# Patient Record
Sex: MALE
[De-identification: names, ages, dates, MRNs, and addresses within clinical notes are randomized per-mention and may not be internally consistent; named-entity substitution may affect disease eponyms.]

---

## 2023-03-08 PROBLEM — Z00.00 ENCOUNTER FOR PREVENTIVE HEALTH EXAMINATION: Status: ACTIVE | Noted: 2023-03-08

## 2023-03-10 ENCOUNTER — APPOINTMENT (OUTPATIENT)
Dept: UROLOGY | Facility: CLINIC | Age: 33
End: 2023-03-10

## 2024-02-08 ENCOUNTER — APPOINTMENT (OUTPATIENT)
Dept: ORTHOPEDIC SURGERY | Facility: CLINIC | Age: 34
End: 2024-02-08
Payer: MEDICAID

## 2024-02-08 PROCEDURE — 99203 OFFICE O/P NEW LOW 30 MIN: CPT

## 2024-02-08 PROCEDURE — 73110 X-RAY EXAM OF WRIST: CPT | Mod: 50

## 2024-02-08 NOTE — ASSESSMENT
[FreeTextEntry1] : My impression is the patient has a stable left wrist dorsal triquetral avulsion fracture.  I did mention the possibility of other occult fractures that are not noticeable on today's x-rays.  I recommended 4 weeks of left wrist full-time immobilization and avoidance of any straining, loading or significant weightbearing.  I encouraged digital range of motion exercises.  I explained the stable nature of the triquetral avulsion fracture.  I directed he follow back up with me in 4 weeks for reevaluation, new x-rays, and most likely progression with mobilization and moderate activities given no other fractures or carpal injuries are visualized and he notices continued improvement in his symptoms.  All questions were answered.

## 2024-02-08 NOTE — PHYSICAL EXAM
[de-identified] : Physical exam demonstrates the patient to be alert and oriented x 3 and capable of ambulation. The patient is well-developed and well-nourished in no apparent respiratory distress. The majority of the skin is intact bilaterally in the upper extremities without any bilateral elbow lymphadenopathy.  Evaluation of both elbows reveals full symmetric range of motion from full extension to 140 of flexion with full pronation and full supination.   The wrists have symmetric range of motion bilaterally. There is tenderness over the left ulnar dorsal aspect of the wrist, directly over the triquetrum. There is discomfort with passive terminal left wrist flexion. There is no tenderness over the scaphoid or scapholunate ligaments. There is a negative Wagner's test bilaterally. There is no tenderness over the distal radial ulnar joint or TFCC and no evidence of instability bilaterally. There is no tenderness over the pisotriquetral joint, hamate hook, or CMC joints bilaterally. The patient is nontender over both scaphoids and anatomic snuffbox is bilaterally. There is no clubbing cyanosis or edema.  Full, symmetric digital ROM. There is good capillary refill of the digits bilaterally. Sensation is intact to light touch bilaterally. [de-identified] : PA, lateral, oblique and stress views of the left wrist were obtained to assess for bony injury.  Possible minimally displaced left dorsal triquetral avulsion fracture.  No carpal malalignment.  No increased lunate sclerosis or collapse.  No radiographic signs of ulnar impaction.  There is ulnar positive variance bilaterally.

## 2024-02-08 NOTE — HISTORY OF PRESENT ILLNESS
[FreeTextEntry1] : Date of Injury: February 5, 2024 (3 days ago)  34 year old male presents for evaluation of left wrist injury which he sustained 3 days ago. The patient states that he slipped in his bathtub and fell onto his left hand. The patient had immediate discomfort and swelling of the left wrist after the fall. The patient has been wearing a prefabricated splint since Monday night and notices an improvement in his symptoms. The patient denies any numbness or tingling of the left hand.

## 2024-03-05 ENCOUNTER — APPOINTMENT (OUTPATIENT)
Dept: ORTHOPEDIC SURGERY | Facility: CLINIC | Age: 34
End: 2024-03-05
Payer: MEDICAID

## 2024-03-05 PROCEDURE — 99213 OFFICE O/P EST LOW 20 MIN: CPT

## 2024-03-05 PROCEDURE — 76000 FLUOROSCOPY <1 HR PHYS/QHP: CPT

## 2024-03-14 ENCOUNTER — APPOINTMENT (OUTPATIENT)
Dept: ORTHOPEDIC SURGERY | Facility: CLINIC | Age: 34
End: 2024-03-14
Payer: MEDICAID

## 2024-03-14 PROCEDURE — ZZZZZ: CPT

## 2024-03-14 NOTE — HISTORY OF PRESENT ILLNESS
[FreeTextEntry1] : The patient contacted my office today regarding several questions he had in his recovery from the left dorsal triquetral avulsion fracture.  He overall notes no pain and some mild discomfort when moving his wrist.  He had been wearing the splint, as directed.  He has also followed up with hand therapy

## 2024-03-14 NOTE — ASSESSMENT
[FreeTextEntry1] : I had a lengthy discussion with the patient today regarding his healing left wrist triquetral avulsion fracture.  The patient had several questions regarding his rehab and I mentioned that given he is just over 5 weeks from his injury, that he should continue to intermittently wear the splint and wean off of it over the next week.  I directed he progress with activities, gradually, as he feels comfortable and to make sure not to bear significant weight, load or strain through the left wrist for another 4 to 6 weeks.  He will plan to follow-up with me around that time.  All questions were answered and he was in accordance with the plan

## 2024-04-16 ENCOUNTER — APPOINTMENT (OUTPATIENT)
Dept: ORTHOPEDIC SURGERY | Facility: CLINIC | Age: 34
End: 2024-04-16
Payer: MEDICAID

## 2024-04-16 DIAGNOSIS — M25.532 PAIN IN LEFT WRIST: ICD-10-CM

## 2024-04-16 PROCEDURE — 99024 POSTOP FOLLOW-UP VISIT: CPT

## 2024-04-16 PROCEDURE — 73110 X-RAY EXAM OF WRIST: CPT | Mod: LT

## 2024-04-16 PROCEDURE — 99213 OFFICE O/P EST LOW 20 MIN: CPT

## 2024-04-16 NOTE — ASSESSMENT
[FreeTextEntry1] : I advised the patient once again that he is progressing well with his stable left dorsal triquetral avulsion fracture treated with splint immobilization. I advised the patient that radiographic imaging obtained today shows continued maintenance of proper alignment of the fracture site and overall carpus and that some interval bony healing can be appreciated at the triquetral fracture site. I therefore recommended that the patient discontinue wearing the splint and progress with more ADL's as tolerated.  I recommended the patient progress with activities in a gradual manner.  I advised the patient to follow up with me if he has any lingering issues or concerns within the next 2 months, as he begins to use the left wrist more.

## 2024-04-16 NOTE — PHYSICAL EXAM
[de-identified] : Evaluation of both elbows reveals full symmetric range of motion from full extension to 140 of flexion with full pronation and full supination.  The wrists have near symmetric range of motion bilaterally, mildly diminished terminal left wrist flexion and extension. There is no tenderness over the left ulnar dorsal aspect of the wrist, directly over the triquetrum. Nontender over the left anatomic snuffbox. There is no tenderness over the dorsal scaphoid or scapholunate ligaments. There is a negative Wagner's test bilaterally. There is no tenderness over the distal radial ulnar joint or TFCC and no evidence of instability bilaterally. There is no tenderness over the pisotriquetral joint, hamate hook, or CMC joints bilaterally. The patient is nontender over both scaphoids and anatomic snuffbox is bilaterally. There is no clubbing cyanosis or edema.  Full, symmetric digital ROM. There is good capillary refill of the digits bilaterally. Sensation is intact to light touch bilaterally. [de-identified] : PA, lateral, oblique and scaphoid x-ray images were obtained to assess the stable triquetral avulsion fracture. No appreciation of new fracture at the carpus and distal radius. Unchanged position of the triquetral avulsion fracture at the dorsal ulnar hand with some interval bony healing noted.  No increased lunate sclerosis or collapse; no carpal malalignment.

## 2024-04-16 NOTE — HISTORY OF PRESENT ILLNESS
[FreeTextEntry1] : Date of injury: 2.5.24 (10 weeks, 1 day)  The patient is returning for follow-up regarding treatment of his left dorsal triquetral avulsion fracture.  He feels that his symptoms have been improving since the last visit. The patient is lifting up to 10 pounds with no significant discomfort. The patient is still avoiding any weightbearing activities at this time.

## 2024-06-19 ENCOUNTER — APPOINTMENT (OUTPATIENT)
Dept: ORTHOPEDIC SURGERY | Facility: CLINIC | Age: 34
End: 2024-06-19
Payer: MEDICAID

## 2024-06-19 VITALS — WEIGHT: 260 LBS | HEIGHT: 74 IN | BODY MASS INDEX: 33.37 KG/M2

## 2024-06-19 DIAGNOSIS — M94.261 CHONDROMALACIA, RIGHT KNEE: ICD-10-CM

## 2024-06-19 PROCEDURE — 73562 X-RAY EXAM OF KNEE 3: CPT | Mod: RT

## 2024-06-19 PROCEDURE — 99213 OFFICE O/P EST LOW 20 MIN: CPT

## 2024-06-25 NOTE — HISTORY OF PRESENT ILLNESS
[de-identified] :  Initial Visit:  Right knee pain  Reason: while running up stairs  Duration: 1 week  Prior studies: x rays ordered  Symptoms: stabbing  Aggravating Fx: squatting  Alleviating Fx: knee brace  Pain: 3/10 Pain med: none  Medical Hx: none Surgery Hx: 1 years ago - right knee meniscus repair Current MEd: none Allergies: NKA

## 2024-06-25 NOTE — PHYSICAL EXAM
[de-identified] : Right knee  Constitutional:  The patient is healthy-appearing and in no apparent distress.   Gait: The patient ambulates with a normal gait and no limp.  Cardiovascular System:  The capillary refill is less than 2 seconds.   Skin:  There are no skin abnormalities.  Right Knee:   Bony Palpation:  There is no tenderness of the medial joint line.  There is no tenderness of the lateral joint line. There is no tenderness of the medial femoral chondyle. There is no tenderness of the lateral femoral chondyle. There is no tenderness of the tibial tubercle. There is no tenderness of the superior patella. There is no tenderness of the inferior patella. There is tenderness of the medial patellar facet. There is tenderness of the lateral patellar facet.  Soft Tissue Palpation:  There is no tenderness of the medial retinaculum. There is no tenderness of the lateral retinaculum. There is no tenderness of the quadriceps tendon. There is no tenderness of the patella tendon. There is no tenderness of the ITB. There is no tenderness of the pes anserine.  Active Range of Motion:  The range of motion at the knee actively and passively is full.   Special Tests:  There is a negative Apley. There is a negative Steinmanns.  There is a negative Lachman and Anterior Drawer. There is a negative Posterior Drawer.   There is no varus or valgus laxity.  Strength:  There is 5/5 hip flexion and 5/5 knee flexion and extension.    Psychiatric:  The patient demonstrates a normal mood and affect and is active and alert.   [de-identified] : X-ray right knee: There is no significant bony / soft tissue abnormality, arthritis, or fracture.

## 2024-06-25 NOTE — ASSESSMENT
[FreeTextEntry1] : Reviewed at length with patient exam history and imaging as well as treatment options and at this time patient elects home exercises observation encouraged to avoid hyperflexion knee past 90 degrees if no Significant improvement he will follow-up in office

## 2024-06-26 ENCOUNTER — APPOINTMENT (OUTPATIENT)
Dept: ORTHOPEDIC SURGERY | Facility: CLINIC | Age: 34
End: 2024-06-26

## 2024-07-18 ENCOUNTER — APPOINTMENT (OUTPATIENT)
Dept: ORTHOPEDIC SURGERY | Facility: CLINIC | Age: 34
End: 2024-07-18
Payer: MEDICAID

## 2024-07-18 DIAGNOSIS — M94.261 CHONDROMALACIA, RIGHT KNEE: ICD-10-CM

## 2024-07-18 PROCEDURE — 99213 OFFICE O/P EST LOW 20 MIN: CPT

## 2024-08-22 ENCOUNTER — APPOINTMENT (OUTPATIENT)
Dept: ORTHOPEDIC SURGERY | Facility: CLINIC | Age: 34
End: 2024-08-22
Payer: MEDICAID

## 2024-08-22 DIAGNOSIS — M94.261 CHONDROMALACIA, RIGHT KNEE: ICD-10-CM

## 2024-08-22 PROCEDURE — 99213 OFFICE O/P EST LOW 20 MIN: CPT

## 2024-08-22 NOTE — HISTORY OF PRESENT ILLNESS
[de-identified] : Last Visit:  july 18, 24 Reason: right knee  Pain level: 2/10 Symptoms: moving/ bending  Physical therapy/Home exercises: physical therapy

## 2024-08-22 NOTE — ASSESSMENT
[FreeTextEntry1] : Discussed at length with patient overall clinical improvement but persistent symptoms recommend continued home exercises and physical therapy

## 2024-08-22 NOTE — PHYSICAL EXAM
[de-identified] : Right knee  Constitutional:  The patient is healthy-appearing and in no apparent distress.   Gait: The patient ambulates with a normal gait and no limp.  Cardiovascular System:  The capillary refill is less than 2 seconds.   Skin:  There are no skin abnormalities.  Right Knee:   Bony Palpation:  There is no tenderness of the medial joint line.  There is no tenderness of the lateral joint line. There is no tenderness of the medial femoral chondyle. There is no tenderness of the lateral femoral chondyle. There is no tenderness of the tibial tubercle. There is no tenderness of the superior patella. There is no tenderness of the inferior patella. There is tenderness of the medial patellar facet. There is tenderness of the lateral patellar facet.  Soft Tissue Palpation:  There is no tenderness of the medial retinaculum. There is no tenderness of the lateral retinaculum. There is no tenderness of the quadriceps tendon. There is no tenderness of the patella tendon. There is no tenderness of the ITB. There is no tenderness of the pes anserine.  Active Range of Motion:  The range of motion at the knee actively and passively is full.   Special Tests:  There is a negative Apley. There is a negative Steinmanns.  There is a negative Lachman and Anterior Drawer. There is a negative Posterior Drawer.   There is no varus or valgus laxity.  Strength:  There is 5/5 hip flexion and 5/5 knee flexion and extension.    Psychiatric:  The patient demonstrates a normal mood and affect and is active and alert.

## 2024-09-19 ENCOUNTER — APPOINTMENT (OUTPATIENT)
Dept: ORTHOPEDIC SURGERY | Facility: CLINIC | Age: 34
End: 2024-09-19
Payer: MEDICAID

## 2024-09-19 DIAGNOSIS — M94.261 CHONDROMALACIA, RIGHT KNEE: ICD-10-CM

## 2024-09-19 PROCEDURE — 99213 OFFICE O/P EST LOW 20 MIN: CPT

## 2024-09-19 NOTE — PHYSICAL EXAM
[de-identified] : Right knee  Constitutional:  The patient is healthy-appearing and in no apparent distress.   Gait: The patient ambulates with a normal gait and no limp.  Cardiovascular System:  The capillary refill is less than 2 seconds.   Skin:  There are no skin abnormalities.  Right Knee:   Bony Palpation:  There is no tenderness of the medial joint line.  There is no tenderness of the lateral joint line. There is no tenderness of the medial femoral chondyle. There is no tenderness of the lateral femoral chondyle. There is no tenderness of the tibial tubercle. There is no tenderness of the superior patella. There is no tenderness of the inferior patella. There is tenderness of the medial patellar facet. There is tenderness of the lateral patellar facet.  Soft Tissue Palpation:  There is no tenderness of the medial retinaculum. There is no tenderness of the lateral retinaculum. There is no tenderness of the quadriceps tendon. There is no tenderness of the patella tendon. There is no tenderness of the ITB. There is no tenderness of the pes anserine.  Active Range of Motion:  The range of motion at the knee actively and passively is full.   Special Tests:  There is a negative Apley. There is a negative Steinmanns.  There is a negative Lachman and Anterior Drawer. There is a negative Posterior Drawer.   There is no varus or valgus laxity.  Strength:  There is 5/5 hip flexion and 5/5 knee flexion and extension.    Psychiatric:  The patient demonstrates a normal mood and affect and is active and alert.

## 2024-09-19 NOTE — HISTORY OF PRESENT ILLNESS
[de-identified] : Last visit: 8/22/2024 Reason: Right knee pain  Symptoms: No new signs or symptoms  Pain level: 1/10 Physical therapy/ Home exercises: Currently - Improving

## 2024-10-16 ENCOUNTER — APPOINTMENT (OUTPATIENT)
Dept: ORTHOPEDIC SURGERY | Facility: CLINIC | Age: 34
End: 2024-10-16
Payer: MEDICAID

## 2024-10-16 DIAGNOSIS — M94.261 CHONDROMALACIA, RIGHT KNEE: ICD-10-CM

## 2024-10-16 PROCEDURE — 20610 DRAIN/INJ JOINT/BURSA W/O US: CPT | Mod: RT

## 2024-10-16 PROCEDURE — 99213 OFFICE O/P EST LOW 20 MIN: CPT | Mod: 25

## 2024-10-17 ENCOUNTER — APPOINTMENT (OUTPATIENT)
Dept: ORTHOPEDIC SURGERY | Facility: CLINIC | Age: 34
End: 2024-10-17

## 2024-11-27 DIAGNOSIS — M94.261 CHONDROMALACIA, RIGHT KNEE: ICD-10-CM

## 2024-12-26 ENCOUNTER — APPOINTMENT (OUTPATIENT)
Dept: ORTHOPEDIC SURGERY | Facility: CLINIC | Age: 34
End: 2024-12-26
Payer: MEDICAID

## 2024-12-26 DIAGNOSIS — M94.261 CHONDROMALACIA, RIGHT KNEE: ICD-10-CM

## 2024-12-26 PROCEDURE — 99213 OFFICE O/P EST LOW 20 MIN: CPT

## 2025-05-27 ENCOUNTER — APPOINTMENT (OUTPATIENT)
Dept: ORTHOPEDIC SURGERY | Facility: CLINIC | Age: 35
End: 2025-05-27
Payer: SELF-PAY

## 2025-05-27 DIAGNOSIS — S83.241D OTHER TEAR OF MEDIAL MENISCUS, CURRENT INJURY, RIGHT KNEE, SUBSEQUENT ENCOUNTER: ICD-10-CM

## 2025-05-27 DIAGNOSIS — M94.261 CHONDROMALACIA, RIGHT KNEE: ICD-10-CM

## 2025-05-27 PROCEDURE — 99213 OFFICE O/P EST LOW 20 MIN: CPT | Mod: 25

## 2025-05-27 PROCEDURE — 20610 DRAIN/INJ JOINT/BURSA W/O US: CPT | Mod: RT

## 2025-06-01 PROBLEM — S83.241D ACUTE MEDIAL MENISCUS TEAR OF RIGHT KNEE, SUBSEQUENT ENCOUNTER: Status: ACTIVE | Noted: 2025-06-01
